# Patient Record
Sex: MALE | Race: AMERICAN INDIAN OR ALASKA NATIVE | HISPANIC OR LATINO | Employment: FULL TIME | URBAN - METROPOLITAN AREA
[De-identification: names, ages, dates, MRNs, and addresses within clinical notes are randomized per-mention and may not be internally consistent; named-entity substitution may affect disease eponyms.]

---

## 2023-03-18 ENCOUNTER — APPOINTMENT (OUTPATIENT)
Dept: RADIOLOGY | Facility: MEDICAL CENTER | Age: 38
End: 2023-03-18
Attending: EMERGENCY MEDICINE

## 2023-03-18 ENCOUNTER — HOSPITAL ENCOUNTER (EMERGENCY)
Facility: MEDICAL CENTER | Age: 38
End: 2023-03-18
Attending: EMERGENCY MEDICINE

## 2023-03-18 VITALS
HEART RATE: 106 BPM | SYSTOLIC BLOOD PRESSURE: 145 MMHG | DIASTOLIC BLOOD PRESSURE: 91 MMHG | BODY MASS INDEX: 26.98 KG/M2 | RESPIRATION RATE: 16 BRPM | OXYGEN SATURATION: 95 % | HEIGHT: 71 IN | WEIGHT: 192.68 LBS | TEMPERATURE: 97 F

## 2023-03-18 DIAGNOSIS — J22 LOWER RESPIRATORY INFECTION: ICD-10-CM

## 2023-03-18 DIAGNOSIS — R05.1 ACUTE COUGH: ICD-10-CM

## 2023-03-18 LAB
HIV 1+2 AB+HIV1 P24 AG SERPL QL IA: NORMAL
T PALLIDUM AB SER QL IA: NORMAL

## 2023-03-18 PROCEDURE — 87591 N.GONORRHOEAE DNA AMP PROB: CPT

## 2023-03-18 PROCEDURE — 36415 COLL VENOUS BLD VENIPUNCTURE: CPT

## 2023-03-18 PROCEDURE — C9803 HOPD COVID-19 SPEC COLLECT: HCPCS | Performed by: EMERGENCY MEDICINE

## 2023-03-18 PROCEDURE — 87491 CHLMYD TRACH DNA AMP PROBE: CPT

## 2023-03-18 PROCEDURE — 0240U HCHG SARS-COV-2 COVID-19 NFCT DS RESP RNA 3 TRGT MIC: CPT

## 2023-03-18 PROCEDURE — 86780 TREPONEMA PALLIDUM: CPT

## 2023-03-18 PROCEDURE — 99285 EMERGENCY DEPT VISIT HI MDM: CPT

## 2023-03-18 PROCEDURE — 71045 X-RAY EXAM CHEST 1 VIEW: CPT

## 2023-03-18 PROCEDURE — 87389 HIV-1 AG W/HIV-1&-2 AB AG IA: CPT

## 2023-03-18 RX ORDER — DOXYCYCLINE 100 MG/1
100 CAPSULE ORAL 2 TIMES DAILY
Qty: 14 CAPSULE | Refills: 0 | Status: ACTIVE | OUTPATIENT
Start: 2023-03-18 | End: 2023-03-25

## 2023-03-18 NOTE — Clinical Note
Michael Ram was seen and treated in our emergency department on 3/18/2023.  He may return to work on 03/19/2023.       If you have any questions or concerns, please don't hesitate to call.      David Barger M.D.

## 2023-03-18 NOTE — ED TRIAGE NOTES
"Chief Complaint   Patient presents with    Cough     X 2 weeks, productive 'brown and yellow sputum', pt states known sick contact just before cough started, denies fevers but endorses intermittent chills     Pt ambulatory to triage for above complaints, VSS on RA, GCS 15, NAD.    Pt returned to [facility]. Educated on triage process and to inform staff of any changes.     BP (!) 157/101   Pulse (!) 109   Temp 35.9 °C (96.7 °F) (Temporal)   Resp 18   Ht 1.803 m (5' 11\")   Wt 87.4 kg (192 lb 10.9 oz)   SpO2 99%   BMI 26.87 kg/m²     "

## 2023-03-18 NOTE — DISCHARGE INSTRUCTIONS
Please check results of COVID and flu testing on your phone, MyChart, then act accordingly.  Return for shortness of breath or any concerns.  Follow-up with your doctor for recheck in 1 week if not better.    Please quit smoking

## 2023-03-18 NOTE — ED NOTES
Discharge teaching and paperwork provided and all questions/concerns answered. VSS, assessment stable. Given information regarding Rx. Patient discharged to the care of self/significant other and ambulated out of the ED.

## 2023-03-19 LAB
C TRACH DNA SPEC QL NAA+PROBE: NEGATIVE
FLUAV RNA SPEC QL NAA+PROBE: NEGATIVE
FLUBV RNA SPEC QL NAA+PROBE: NEGATIVE
N GONORRHOEA DNA SPEC QL NAA+PROBE: NEGATIVE
SARS-COV-2 RNA RESP QL NAA+PROBE: DETECTED
SPECIMEN SOURCE: ABNORMAL
SPECIMEN SOURCE: NORMAL
